# Patient Record
Sex: MALE | Race: WHITE | NOT HISPANIC OR LATINO | Employment: FULL TIME | URBAN - METROPOLITAN AREA
[De-identification: names, ages, dates, MRNs, and addresses within clinical notes are randomized per-mention and may not be internally consistent; named-entity substitution may affect disease eponyms.]

---

## 2018-02-06 ENCOUNTER — OFFICE VISIT (OUTPATIENT)
Dept: FAMILY MEDICINE CLINIC | Facility: CLINIC | Age: 58
End: 2018-02-06

## 2018-02-06 VITALS
SYSTOLIC BLOOD PRESSURE: 110 MMHG | WEIGHT: 176.5 LBS | HEART RATE: 65 BPM | BODY MASS INDEX: 25.27 KG/M2 | RESPIRATION RATE: 14 BRPM | OXYGEN SATURATION: 98 % | TEMPERATURE: 98.3 F | DIASTOLIC BLOOD PRESSURE: 62 MMHG | HEIGHT: 70 IN

## 2018-02-06 DIAGNOSIS — Z00.00 ENCOUNTER FOR HEALTH MAINTENANCE EXAMINATION: Primary | ICD-10-CM

## 2018-02-06 DIAGNOSIS — Z12.12 SCREENING FOR COLORECTAL CANCER: ICD-10-CM

## 2018-02-06 DIAGNOSIS — Z12.11 SCREENING FOR COLORECTAL CANCER: ICD-10-CM

## 2018-02-06 DIAGNOSIS — Z12.5 SCREENING FOR PROSTATE CANCER: ICD-10-CM

## 2018-02-06 DIAGNOSIS — Z13.1 SCREENING FOR DIABETES MELLITUS: ICD-10-CM

## 2018-02-06 DIAGNOSIS — Z11.59 ENCOUNTER FOR HEPATITIS C SCREENING TEST FOR LOW RISK PATIENT: ICD-10-CM

## 2018-02-06 DIAGNOSIS — Z13.220 SCREENING FOR LIPID DISORDERS: ICD-10-CM

## 2018-02-06 PROCEDURE — 99386 PREV VISIT NEW AGE 40-64: CPT | Performed by: FAMILY MEDICINE

## 2018-02-06 NOTE — PROGRESS NOTES
Subjective   Marcelo Mackenzie is a 57 y.o. male.     Chief Complaint   Patient presents with   • Establish Care     Patient needs to establish a care. He needs a physical exam.        HPI     Patient presents the office today as a new patient for health maintenance exam.  Patient denies any significant past medical history.  He takes no medications.  Tries to maintain healthy diet and exercise regimen.  He denies any tobacco or recreational drug use.  Has 3 or 4 alcoholic beverages weekly.  No significant family history of illness.  He is due for a colonoscopy.    The following portions of the patient's history were reviewed and updated as appropriate: allergies, current medications, past family history, past medical history, past social history, past surgical history and problem list.    Review of Systems   Constitutional: Negative for activity change.   All other systems reviewed and are negative.      Objective  Vitals:    02/06/18 1233   BP: 110/62   Pulse: 65   Resp: 14   Temp: 98.3 °F (36.8 °C)   SpO2: 98%        Physical Exam   Constitutional: He is oriented to person, place, and time. He appears well-developed and well-nourished. No distress.   HENT:   Head: Normocephalic and atraumatic.   Right Ear: External ear normal.   Left Ear: External ear normal.   Nose: Nose normal.   Mouth/Throat: Oropharynx is clear and moist.   Eyes: Conjunctivae and EOM are normal. Pupils are equal, round, and reactive to light. Right eye exhibits no discharge. Left eye exhibits no discharge. No scleral icterus.   Neck: Normal range of motion. Neck supple.   Cardiovascular: Normal rate, regular rhythm and normal heart sounds.  Exam reveals no friction rub.    No murmur heard.  Pulmonary/Chest: Effort normal and breath sounds normal. No respiratory distress. He has no wheezes. He has no rales.   Abdominal: Soft. Bowel sounds are normal. He exhibits no distension. There is no tenderness. There is no rebound and no guarding.    Lymphadenopathy:     He has no cervical adenopathy.   Neurological: He is alert and oriented to person, place, and time.   Skin: Skin is warm and dry. He is not diaphoretic.   Nursing note and vitals reviewed.      No current outpatient prescriptions on file.    Procedures    Lab Results (most recent)     None          Assessment/Plan   Marcelo was seen today for establish care.    Diagnoses and all orders for this visit:    Encounter for health maintenance examination    Screening for lipid disorders  -     Lipid Panel    Screening for diabetes mellitus  -     Comprehensive Metabolic Panel  -     Hemoglobin A1c    Screening for colorectal cancer  -     Ambulatory Referral For Screening Colonoscopy    Screening for prostate cancer  -     PSA Screen    Encounter for hepatitis C screening test for low risk patient  -     Hepatitis C antibody      Preventative health maintenance and screening as above.  We'll request records from the patient's primary AV is primary care provider.    Return for As needed.      Kevin Ocampo MD

## 2018-02-07 LAB
ALBUMIN SERPL-MCNC: 4.4 G/DL (ref 3.5–5.2)
ALBUMIN/GLOB SERPL: 1.5 G/DL
ALP SERPL-CCNC: 64 U/L (ref 39–117)
ALT SERPL-CCNC: 21 U/L (ref 1–41)
AST SERPL-CCNC: 14 U/L (ref 1–40)
BILIRUB SERPL-MCNC: 0.7 MG/DL (ref 0.1–1.2)
BUN SERPL-MCNC: 14 MG/DL (ref 6–20)
BUN/CREAT SERPL: 12.1 (ref 7–25)
CALCIUM SERPL-MCNC: 9.9 MG/DL (ref 8.6–10.5)
CHLORIDE SERPL-SCNC: 100 MMOL/L (ref 98–107)
CHOLEST SERPL-MCNC: 235 MG/DL (ref 0–200)
CO2 SERPL-SCNC: 26.8 MMOL/L (ref 22–29)
CREAT SERPL-MCNC: 1.16 MG/DL (ref 0.76–1.27)
GFR SERPLBLD CREATININE-BSD FMLA CKD-EPI: 65 ML/MIN/1.73
GFR SERPLBLD CREATININE-BSD FMLA CKD-EPI: 79 ML/MIN/1.73
GLOBULIN SER CALC-MCNC: 2.9 GM/DL
GLUCOSE SERPL-MCNC: 96 MG/DL (ref 65–99)
HBA1C MFR BLD: 5.48 % (ref 4.8–5.6)
HCV AB S/CO SERPL IA: 0.1 S/CO RATIO (ref 0–0.9)
HDLC SERPL-MCNC: 55 MG/DL (ref 40–60)
LDLC SERPL CALC-MCNC: 159 MG/DL (ref 0–100)
POTASSIUM SERPL-SCNC: 4.3 MMOL/L (ref 3.5–5.2)
PROT SERPL-MCNC: 7.3 G/DL (ref 6–8.5)
PSA SERPL-MCNC: 3.68 NG/ML (ref 0–4)
SODIUM SERPL-SCNC: 138 MMOL/L (ref 136–145)
TRIGL SERPL-MCNC: 107 MG/DL (ref 0–150)
VLDLC SERPL CALC-MCNC: 21.4 MG/DL (ref 5–40)

## 2019-04-30 ENCOUNTER — OFFICE VISIT (OUTPATIENT)
Dept: FAMILY MEDICINE CLINIC | Facility: CLINIC | Age: 59
End: 2019-04-30

## 2019-04-30 VITALS
WEIGHT: 179.4 LBS | OXYGEN SATURATION: 97 % | SYSTOLIC BLOOD PRESSURE: 130 MMHG | HEART RATE: 77 BPM | TEMPERATURE: 98.3 F | RESPIRATION RATE: 14 BRPM | BODY MASS INDEX: 25.68 KG/M2 | DIASTOLIC BLOOD PRESSURE: 62 MMHG | HEIGHT: 70 IN

## 2019-04-30 DIAGNOSIS — Z00.00 ENCOUNTER FOR HEALTH MAINTENANCE EXAMINATION: Primary | ICD-10-CM

## 2019-04-30 DIAGNOSIS — L98.9 SKIN LESION: ICD-10-CM

## 2019-04-30 DIAGNOSIS — Z12.12 SCREENING FOR COLORECTAL CANCER: ICD-10-CM

## 2019-04-30 DIAGNOSIS — Z13.1 SCREENING FOR DIABETES MELLITUS: ICD-10-CM

## 2019-04-30 DIAGNOSIS — Z12.5 SCREENING FOR PROSTATE CANCER: ICD-10-CM

## 2019-04-30 DIAGNOSIS — Z13.29 SCREENING FOR THYROID DISORDER: ICD-10-CM

## 2019-04-30 DIAGNOSIS — Z12.11 SCREENING FOR COLORECTAL CANCER: ICD-10-CM

## 2019-04-30 DIAGNOSIS — E78.5 HYPERLIPIDEMIA, UNSPECIFIED HYPERLIPIDEMIA TYPE: ICD-10-CM

## 2019-04-30 PROBLEM — M51.36 DDD (DEGENERATIVE DISC DISEASE), LUMBAR: Status: ACTIVE | Noted: 2019-04-30

## 2019-04-30 PROCEDURE — 99396 PREV VISIT EST AGE 40-64: CPT | Performed by: FAMILY MEDICINE

## 2019-04-30 RX ORDER — CETIRIZINE HYDROCHLORIDE 10 MG/1
10 TABLET ORAL DAILY
COMMUNITY

## 2019-04-30 NOTE — PROGRESS NOTES
Marcelo Mackenzie is a 59 y.o. male.     Chief Complaint   Patient presents with   • Annual Exam     patient needs to have a physical exam.       HPI     Patient presents to the office today for health maintenance exam.  Patient tries to maintain healthy diet and exercise regimen.  He is a non-smoker.  No interval changes in family history of illness.  He takes no medications other than some cetirizine for seasonal allergies.  He is in need of colonoscopy as well as prostate cancer screening.    The following portions of the patient's history were reviewed and updated as appropriate: allergies, current medications, past family history, past medical history, past social history, past surgical history and problem list.    Review of Systems   Constitutional: Negative for activity change and appetite change.   All other systems reviewed and are negative.      Objective  Vitals:    04/30/19 1050   BP: 130/62   Pulse: 77   Resp: 14   Temp: 98.3 °F (36.8 °C)   SpO2: 97%       Physical Exam   Constitutional: He is oriented to person, place, and time. He appears well-developed and well-nourished. No distress.   HENT:   Head: Normocephalic and atraumatic.   Right Ear: External ear normal.   Left Ear: External ear normal.   Nose: Nose normal.   Mouth/Throat: Oropharynx is clear and moist.   Eyes: Conjunctivae and EOM are normal. Pupils are equal, round, and reactive to light. Right eye exhibits no discharge. Left eye exhibits no discharge. No scleral icterus.   Neck: Normal range of motion. Neck supple.   Cardiovascular: Normal rate, regular rhythm and normal heart sounds. Exam reveals no friction rub.   No murmur heard.  Pulmonary/Chest: Effort normal and breath sounds normal. No respiratory distress. He has no wheezes. He has no rales.   Abdominal: Soft. Bowel sounds are normal. He exhibits no distension. There is no tenderness. There is no rebound and no guarding.   Lymphadenopathy:     He has no cervical adenopathy.    Neurological: He is alert and oriented to person, place, and time.   Skin: Skin is warm and dry. He is not diaphoretic.   Nursing note and vitals reviewed.        Current Outpatient Medications:   •  cetirizine (zyrTEC) 10 MG tablet, Take 10 mg by mouth Daily., Disp: , Rfl:   Current outpatient and discharge medications have been reconciled for the patient.  Reviewed by: Kevin Ocampo MD      Procedures    Lab Results (most recent)     None                  Marcelo was seen today for annual exam.    Diagnoses and all orders for this visit:    Encounter for health maintenance examination    Hyperlipidemia, unspecified hyperlipidemia type  -     Lipid Panel    Screening for diabetes mellitus  -     Comprehensive Metabolic Panel  -     Hemoglobin A1c    Screening for thyroid disorder  -     Thyroid Panel With TSH    Screening for colorectal cancer  -     Ambulatory Referral For Screening Colonoscopy    Screening for prostate cancer  -     PSA Screen    Skin lesion  -     Ambulatory Referral to Dermatology      Screening labs as above.  Discussed preventative health measures including diet and exercise.    Return for As needed.      Kevin Ocampo MD

## 2019-05-01 LAB
ALBUMIN SERPL-MCNC: 4.5 G/DL (ref 3.5–5.2)
ALBUMIN/GLOB SERPL: 1.6 G/DL
ALP SERPL-CCNC: 68 U/L (ref 39–117)
ALT SERPL-CCNC: 23 U/L (ref 1–41)
AST SERPL-CCNC: 17 U/L (ref 1–40)
BILIRUB SERPL-MCNC: 0.6 MG/DL (ref 0.2–1.2)
BUN SERPL-MCNC: 16 MG/DL (ref 6–20)
BUN/CREAT SERPL: 15.2 (ref 7–25)
CALCIUM SERPL-MCNC: 10 MG/DL (ref 8.6–10.5)
CHLORIDE SERPL-SCNC: 103 MMOL/L (ref 98–107)
CHOLEST SERPL-MCNC: 215 MG/DL (ref 0–200)
CO2 SERPL-SCNC: 25.8 MMOL/L (ref 22–29)
CREAT SERPL-MCNC: 1.05 MG/DL (ref 0.76–1.27)
FT4I SERPL CALC-MCNC: 1.5 (ref 1.2–4.9)
GLOBULIN SER CALC-MCNC: 2.9 GM/DL
GLUCOSE SERPL-MCNC: 100 MG/DL (ref 65–99)
HBA1C MFR BLD: 5.5 % (ref 4.8–5.6)
HDLC SERPL-MCNC: 54 MG/DL (ref 40–60)
LDLC SERPL CALC-MCNC: 149 MG/DL (ref 0–100)
POTASSIUM SERPL-SCNC: 4.6 MMOL/L (ref 3.5–5.2)
PROT SERPL-MCNC: 7.4 G/DL (ref 6–8.5)
PSA SERPL-MCNC: 2.01 NG/ML (ref 0–4)
SODIUM SERPL-SCNC: 142 MMOL/L (ref 136–145)
T3RU NFR SERPL: 25 % (ref 24–39)
T4 SERPL-MCNC: 5.9 UG/DL (ref 4.5–12)
TRIGL SERPL-MCNC: 61 MG/DL (ref 0–150)
TSH SERPL DL<=0.005 MIU/L-ACNC: 1.72 UIU/ML (ref 0.45–4.5)
VLDLC SERPL CALC-MCNC: 12.2 MG/DL

## 2019-07-05 ENCOUNTER — PREP FOR SURGERY (OUTPATIENT)
Dept: OTHER | Facility: HOSPITAL | Age: 59
End: 2019-07-05

## 2019-07-05 DIAGNOSIS — Z12.11 ENCOUNTER FOR SCREENING COLONOSCOPY: Primary | ICD-10-CM

## 2019-08-19 PROBLEM — Z12.11 ENCOUNTER FOR SCREENING COLONOSCOPY: Status: ACTIVE | Noted: 2019-08-19

## 2019-09-05 ENCOUNTER — HOSPITAL ENCOUNTER (OUTPATIENT)
Facility: HOSPITAL | Age: 59
Setting detail: HOSPITAL OUTPATIENT SURGERY
Discharge: HOME | End: 2019-09-05
Attending: SURGERY | Admitting: SURGERY

## 2019-09-05 ENCOUNTER — ANESTHESIA (OUTPATIENT)
Dept: GASTROENTEROLOGY | Facility: HOSPITAL | Age: 59
End: 2019-09-05

## 2019-09-05 ENCOUNTER — ANESTHESIA EVENT (OUTPATIENT)
Dept: GASTROENTEROLOGY | Facility: HOSPITAL | Age: 59
End: 2019-09-05

## 2019-09-05 VITALS
HEART RATE: 64 BPM | SYSTOLIC BLOOD PRESSURE: 102 MMHG | WEIGHT: 176 LBS | RESPIRATION RATE: 16 BRPM | OXYGEN SATURATION: 98 % | TEMPERATURE: 98.2 F | BODY MASS INDEX: 24.64 KG/M2 | HEIGHT: 71 IN | DIASTOLIC BLOOD PRESSURE: 76 MMHG

## 2019-09-05 DIAGNOSIS — Z12.11 ENCOUNTER FOR SCREENING COLONOSCOPY: ICD-10-CM

## 2019-09-05 PROCEDURE — 45380 COLONOSCOPY AND BIOPSY: CPT | Performed by: SURGERY

## 2019-09-05 PROCEDURE — 25010000002 PROPOFOL 10 MG/ML EMULSION: Performed by: ANESTHESIOLOGY

## 2019-09-05 PROCEDURE — 45385 COLONOSCOPY W/LESION REMOVAL: CPT | Performed by: SURGERY

## 2019-09-05 PROCEDURE — 88305 TISSUE EXAM BY PATHOLOGIST: CPT | Performed by: SURGERY

## 2019-09-05 PROCEDURE — S0260 H&P FOR SURGERY: HCPCS | Performed by: SURGERY

## 2019-09-05 RX ORDER — SODIUM CHLORIDE, SODIUM LACTATE, POTASSIUM CHLORIDE, CALCIUM CHLORIDE 600; 310; 30; 20 MG/100ML; MG/100ML; MG/100ML; MG/100ML
30 INJECTION, SOLUTION INTRAVENOUS CONTINUOUS PRN
Status: DISCONTINUED | OUTPATIENT
Start: 2019-09-05 | End: 2019-09-05 | Stop reason: HOSPADM

## 2019-09-05 RX ORDER — PROPOFOL 10 MG/ML
VIAL (ML) INTRAVENOUS AS NEEDED
Status: DISCONTINUED | OUTPATIENT
Start: 2019-09-05 | End: 2019-09-05 | Stop reason: SURG

## 2019-09-05 RX ORDER — LIDOCAINE HYDROCHLORIDE 20 MG/ML
INJECTION, SOLUTION INFILTRATION; PERINEURAL AS NEEDED
Status: DISCONTINUED | OUTPATIENT
Start: 2019-09-05 | End: 2019-09-05 | Stop reason: SURG

## 2019-09-05 RX ORDER — PROPOFOL 10 MG/ML
VIAL (ML) INTRAVENOUS CONTINUOUS PRN
Status: DISCONTINUED | OUTPATIENT
Start: 2019-09-05 | End: 2019-09-05 | Stop reason: SURG

## 2019-09-05 RX ADMIN — PROPOFOL 100 MCG/KG/MIN: 10 INJECTION, EMULSION INTRAVENOUS at 10:15

## 2019-09-05 RX ADMIN — LIDOCAINE HYDROCHLORIDE 60 MG: 20 INJECTION, SOLUTION INFILTRATION; PERINEURAL at 10:16

## 2019-09-05 RX ADMIN — PROPOFOL 100 MG: 10 INJECTION, EMULSION INTRAVENOUS at 10:17

## 2019-09-05 RX ADMIN — SODIUM CHLORIDE, POTASSIUM CHLORIDE, SODIUM LACTATE AND CALCIUM CHLORIDE 30 ML/HR: 600; 310; 30; 20 INJECTION, SOLUTION INTRAVENOUS at 09:09

## 2019-09-05 NOTE — ANESTHESIA PREPROCEDURE EVALUATION
Anesthesia Evaluation     Patient summary reviewed and Nursing notes reviewed                Airway   Mallampati: I  TM distance: >3 FB  Neck ROM: full  No difficulty expected  Dental - normal exam     Pulmonary - negative pulmonary ROS and normal exam   Cardiovascular - normal exam    (+) hyperlipidemia,       Neuro/Psych- negative ROS  GI/Hepatic/Renal/Endo - negative ROS     Musculoskeletal     Abdominal  - normal exam    Bowel sounds: normal.   Substance History - negative use     OB/GYN negative ob/gyn ROS         Other   (+) arthritis                     Anesthesia Plan    ASA 2     MAC     Anesthetic plan, all risks, benefits, and alternatives have been provided, discussed and informed consent has been obtained with: patient.

## 2019-09-05 NOTE — OP NOTE
Surgeon:     Jose Garza Jr., M.D.    Preoperative Diagnosis:     1.  Need for screening colonoscopy    Postoperative Diagnosis:     1.  Diverticulosis  2.  Colonic polyp x3    Procedure Performed:     1.  Colonoscopy with hot snare polypectomy    Indications:     The patient is a pleasant 59-year-old male who presents for screening colonoscopy.  The patient understands the indications, alternatives, risks, and benefits of the procedure and wishes to proceed.    Procedure:     The patient was identified, taken to the endoscopy suite, and place in the left side down decubitus procedure.  The patient underwent a MAC anesthesia and was appropriately monitored through the case by the anesthesia personnel.  A rectal exam was performed that was normal.  The colonoscope was introduced into the rectum and advanced very carefully to the cecum that was identified by the cecal strap, ileocecal valve, and the appendiceal orifice.  The scope was then slowly withdrawn with careful circumferential examination of the mucosa performed.  The bowel prep was good allowing adequate visualization of mucosal surfaces.  The scope was withdrawn.  The patient tolerated the procedure well and was transferred the recovery area in stable condition.    Findings:    There is diverticulosis in the sigmoid colon with no inflammatory changes.    There were 3 polyps in the colon.  There was a sessile 6 mm polyp in the cecum that was removed by hot snare polypectomy and retrieved.  There were 2 polyps in the left colon, one that was 4 mm in size and removed with cold biopsy forceps and retrieved, and one that was 6 mm and sessile and removed by hot snare polypectomy and retrieved.    Recommendations:     1.  High-fiber diet.  2.  Await pathology results in the polypectomies.  3.  Repeat colonoscopy in 5 years.    Jose Garza Jr., M.D.

## 2019-09-05 NOTE — DISCHARGE INSTRUCTIONS
For the next 24 hours patient needs to be with a responsible adult.    For 24 hours DO NOT drive, operate machinery, appliances, drink alcohol, make important decisions or sign legal documents.    Start with a light or bland diet if you are feeling sick to your stomach otherwise advance to regular diet as tolerated.    Follow recommendations on procedure report if provided by your doctor.    Call Dr Garza for problems [phone]    Problems may include but not limited to: large amounts of bleeding, trouble breathing, repeated vomiting, severe unrelieved pain, fever or chills.

## 2019-09-05 NOTE — H&P
Patient Care Team:  Kevin Ocampo MD as PCP - General (Family Medicine)    Chief complaint:  Need for screening colonoscopy    Subjective     History of Present Illness     The patient is a very pleasant 59-year-old white male who presents for screening colonoscopy.  He had a colonoscopy more than 10 years ago that was normal.  He has had no significant GI symptoms.    Review of Systems   Constitutional: Negative for fatigue and fever.   Respiratory: Negative for chest tightness and shortness of breath.    Cardiovascular: Negative for chest pain and palpitations.   Gastrointestinal: Negative for abdominal pain, blood in stool, constipation, diarrhea, nausea and vomiting.        History reviewed. No pertinent past medical history.  Past Surgical History:   Procedure Laterality Date   • KNEE SURGERY      15 years a go     History reviewed. No pertinent family history.  Social History     Tobacco Use   • Smoking status: Never Smoker   Substance Use Topics   • Alcohol use: Yes     Comment: couple cocktails per week    • Drug use: Not on file     Allergies:  Patient has no known allergies.    Objective      Vital Signs  Temp:  [98.2 °F (36.8 °C)] 98.2 °F (36.8 °C)  Heart Rate:  [59] 59  Resp:  [16] 16  BP: (106)/(59) 106/59    Physical Exam   Constitutional: He is oriented to person, place, and time. He appears well-developed and well-nourished.  Non-toxic appearance.   Eyes: EOM are normal. No scleral icterus.   Pulmonary/Chest: Effort normal. No respiratory distress.   Abdominal: Soft. Normal appearance. There is no tenderness.   Neurological: He is alert and oriented to person, place, and time.   Skin: Skin is warm and dry.   Psychiatric: He has a normal mood and affect. His behavior is normal. Judgment and thought content normal.       Results Review:   I reviewed the patient's new clinical results.      Assessment/Plan       Encounter for screening colonoscopy      Assessment & Plan     1.  Need for  screening colonoscopy: Plan colonoscopy.  The patient understands the indications, alternatives, risks, and benefits of the procedure and wishes to proceed.    I discussed the patients findings and my recommendations with patient    Jose Garza Jr., MD  09/05/19  10:17 AM

## 2019-09-05 NOTE — ANESTHESIA POSTPROCEDURE EVALUATION
"Patient: Marcelo Mackenzie    Procedure Summary     Date:  09/05/19 Room / Location:   [facility] /  [facility]    Anesthesia Start:  1015 Anesthesia Stop:  1050    Procedure:  COLONOSCOPY into cecum with hot snare and cold biopsy polypectomies (N/A ) Diagnosis:       Encounter for screening colonoscopy      (Encounter for screening colonoscopy [Z12.11])    Surgeon:  Jose Garza Jr., MD Provider:  Frank Gallagher MD    Anesthesia Type:  MAC ASA Status:  2          Anesthesia Type: MAC  Last vitals  BP   102/76 (09/05/19 1108)   Temp   36.8 °C (98.2 °F) (09/05/19 0902)   Pulse   64 (09/05/19 1108)   Resp   16 (09/05/19 1108)     SpO2   98 % (09/05/19 1108)     Post Anesthesia Care and Evaluation    Patient location during evaluation: PACU  Patient participation: complete - patient participated  Level of consciousness: awake  Pain score: 0  Pain management: adequate  Airway patency: patent  Anesthetic complications: No anesthetic complications  PONV Status: none  Cardiovascular status: acceptable  Respiratory status: acceptable  Hydration status: acceptable    Comments: /76   Pulse 64   Temp 36.8 °C (98.2 °F) (Oral)   Resp 16   Ht 180.3 cm (71\")   Wt 79.8 kg (176 lb)   SpO2 98%   BMI 24.55 kg/m²       "

## 2019-09-06 LAB
CYTO UR: NORMAL
LAB AP CASE REPORT: NORMAL
PATH REPORT.FINAL DX SPEC: NORMAL
PATH REPORT.GROSS SPEC: NORMAL

## 2019-09-12 ENCOUNTER — TELEPHONE (OUTPATIENT)
Dept: SURGERY | Facility: CLINIC | Age: 59
End: 2019-09-12

## 2019-09-12 NOTE — PROGRESS NOTES
Spoke with pt and gave his pathology results from his colonoscopy. Advised he will need a repeat cscope in 5 yrs. Pt understood. Placed in recall for cscope in 5 yrs.

## 2019-09-12 NOTE — TELEPHONE ENCOUNTER
Spoke with pt and gave his pathology results from his colonoscopy. Advised he will need a repeat cscope in 5 yrs. Pt understood. Placed in recall for cscope in 5 yrs.

## (undated) DEVICE — Device: Brand: DEFENDO AIR/WATER/SUCTION AND BIOPSY VALVE

## (undated) DEVICE — CANNULA,ADULT,SOFT-TOUCH,7'TUBE,UC: Brand: PENDING

## (undated) DEVICE — TUBING, SUCTION, 1/4" X 10', STRAIGHT: Brand: MEDLINE

## (undated) DEVICE — THE SINGLE USE ETRAP – POLYP TRAP IS USED FOR SUCTION RETRIEVAL OF ENDOSCOPICALLY REMOVED POLYPS.: Brand: ETRAP

## (undated) DEVICE — SNAR POLYP SENSATION STDOVL 27 240 BX40

## (undated) DEVICE — THE TORRENT IRRIGATION SCOPE CONNECTOR IS USED WITH THE TORRENT IRRIGATION TUBING TO PROVIDE IRRIGATION FLUIDS SUCH AS STERILE WATER DURING GASTROINTESTINAL ENDOSCOPIC PROCEDURES WHEN USED IN CONJUNCTION WITH AN IRRIGATION PUMP (OR ELECTROSURGICAL UNIT).: Brand: TORRENT

## (undated) DEVICE — ERBE NESSY®PLATE 170 SPLIT; 168CM²; CABLE 3M: Brand: ERBE

## (undated) DEVICE — SENSR O2 OXIMAX FNGR A/ 18IN NONSTR